# Patient Record
Sex: FEMALE | Race: WHITE | NOT HISPANIC OR LATINO
[De-identification: names, ages, dates, MRNs, and addresses within clinical notes are randomized per-mention and may not be internally consistent; named-entity substitution may affect disease eponyms.]

---

## 2020-08-24 ENCOUNTER — APPOINTMENT (OUTPATIENT)
Dept: ANTEPARTUM | Facility: CLINIC | Age: 35
End: 2020-08-24
Payer: COMMERCIAL

## 2020-08-24 PROCEDURE — 76801 OB US < 14 WKS SINGLE FETUS: CPT

## 2020-08-24 PROCEDURE — 76813 OB US NUCHAL MEAS 1 GEST: CPT

## 2020-08-24 PROCEDURE — 36415 COLL VENOUS BLD VENIPUNCTURE: CPT

## 2020-09-21 ENCOUNTER — APPOINTMENT (OUTPATIENT)
Dept: ANTEPARTUM | Facility: CLINIC | Age: 35
End: 2020-09-21
Payer: COMMERCIAL

## 2020-09-21 PROCEDURE — 76811 OB US DETAILED SNGL FETUS: CPT

## 2020-09-21 PROCEDURE — 76817 TRANSVAGINAL US OBSTETRIC: CPT | Mod: 59

## 2020-09-24 PROBLEM — Z00.00 ENCOUNTER FOR PREVENTIVE HEALTH EXAMINATION: Status: ACTIVE | Noted: 2020-09-24

## 2020-10-01 ENCOUNTER — APPOINTMENT (OUTPATIENT)
Dept: ANTEPARTUM | Facility: CLINIC | Age: 35
End: 2020-10-01
Payer: COMMERCIAL

## 2020-10-01 PROCEDURE — 76946 ECHO GUIDE FOR AMNIOCENTESIS: CPT

## 2020-10-01 PROCEDURE — 59000 AMNIOCENTESIS DIAGNOSTIC: CPT

## 2020-10-26 ENCOUNTER — APPOINTMENT (OUTPATIENT)
Dept: ANTEPARTUM | Facility: CLINIC | Age: 35
End: 2020-10-26
Payer: COMMERCIAL

## 2020-10-26 PROCEDURE — 76816 OB US FOLLOW-UP PER FETUS: CPT

## 2020-10-26 PROCEDURE — 76817 TRANSVAGINAL US OBSTETRIC: CPT | Mod: 59

## 2020-10-26 PROCEDURE — 99072 ADDL SUPL MATRL&STAF TM PHE: CPT

## 2020-12-21 ENCOUNTER — APPOINTMENT (OUTPATIENT)
Dept: ANTEPARTUM | Facility: CLINIC | Age: 35
End: 2020-12-21
Payer: COMMERCIAL

## 2020-12-21 PROCEDURE — 99072 ADDL SUPL MATRL&STAF TM PHE: CPT

## 2020-12-21 PROCEDURE — 76817 TRANSVAGINAL US OBSTETRIC: CPT | Mod: 59

## 2020-12-21 PROCEDURE — 76819 FETAL BIOPHYS PROFIL W/O NST: CPT

## 2020-12-21 PROCEDURE — 76816 OB US FOLLOW-UP PER FETUS: CPT

## 2021-01-19 ENCOUNTER — APPOINTMENT (OUTPATIENT)
Dept: ANTEPARTUM | Facility: CLINIC | Age: 36
End: 2021-01-19

## 2021-02-02 ENCOUNTER — APPOINTMENT (OUTPATIENT)
Dept: ANTEPARTUM | Facility: CLINIC | Age: 36
End: 2021-02-02
Payer: COMMERCIAL

## 2021-02-02 PROCEDURE — 76818 FETAL BIOPHYS PROFILE W/NST: CPT

## 2021-02-02 PROCEDURE — 99072 ADDL SUPL MATRL&STAF TM PHE: CPT

## 2021-02-02 PROCEDURE — 76816 OB US FOLLOW-UP PER FETUS: CPT

## 2021-02-08 ENCOUNTER — APPOINTMENT (OUTPATIENT)
Dept: ANTEPARTUM | Facility: CLINIC | Age: 36
End: 2021-02-08
Payer: COMMERCIAL

## 2021-02-08 PROCEDURE — 76816 OB US FOLLOW-UP PER FETUS: CPT

## 2021-02-08 PROCEDURE — 99072 ADDL SUPL MATRL&STAF TM PHE: CPT

## 2021-02-08 PROCEDURE — 76819 FETAL BIOPHYS PROFIL W/O NST: CPT

## 2021-02-15 ENCOUNTER — APPOINTMENT (OUTPATIENT)
Dept: ANTEPARTUM | Facility: CLINIC | Age: 36
End: 2021-02-15

## 2021-03-01 ENCOUNTER — TRANSCRIPTION ENCOUNTER (OUTPATIENT)
Age: 36
End: 2021-03-01

## 2021-03-02 ENCOUNTER — APPOINTMENT (OUTPATIENT)
Dept: ANTEPARTUM | Facility: CLINIC | Age: 36
End: 2021-03-02
Payer: COMMERCIAL

## 2021-03-02 ENCOUNTER — INPATIENT (INPATIENT)
Facility: HOSPITAL | Age: 36
LOS: 2 days | Discharge: HOME | End: 2021-03-05
Attending: OBSTETRICS & GYNECOLOGY | Admitting: OBSTETRICS & GYNECOLOGY
Payer: COMMERCIAL

## 2021-03-02 ENCOUNTER — OUTPATIENT (OUTPATIENT)
Dept: OUTPATIENT SERVICES | Facility: HOSPITAL | Age: 36
LOS: 1 days | End: 2021-03-02
Payer: COMMERCIAL

## 2021-03-02 ENCOUNTER — ASOB RESULT (OUTPATIENT)
Age: 36
End: 2021-03-02

## 2021-03-02 VITALS — HEIGHT: 65 IN | WEIGHT: 154.32 LBS

## 2021-03-02 DIAGNOSIS — Z01.818 ENCOUNTER FOR OTHER PREPROCEDURAL EXAMINATION: ICD-10-CM

## 2021-03-02 DIAGNOSIS — O26.899 OTHER SPECIFIED PREGNANCY RELATED CONDITIONS, UNSPECIFIED TRIMESTER: ICD-10-CM

## 2021-03-02 DIAGNOSIS — Z3A.00 WEEKS OF GESTATION OF PREGNANCY NOT SPECIFIED: ICD-10-CM

## 2021-03-02 LAB
BLD GP AB SCN SERPL QL: NEGATIVE — SIGNIFICANT CHANGE UP
BLD GP AB SCN SERPL QL: NEGATIVE — SIGNIFICANT CHANGE UP
HCT VFR BLD CALC: 37.8 % — SIGNIFICANT CHANGE UP (ref 34.5–45)
HGB BLD-MCNC: 12.5 G/DL — SIGNIFICANT CHANGE UP (ref 11.5–15.5)
MCHC RBC-ENTMCNC: 30.6 PG — SIGNIFICANT CHANGE UP (ref 27–34)
MCHC RBC-ENTMCNC: 33.1 GM/DL — SIGNIFICANT CHANGE UP (ref 32–36)
MCV RBC AUTO: 92.6 FL — SIGNIFICANT CHANGE UP (ref 80–100)
NRBC # BLD: 0 /100 WBCS — SIGNIFICANT CHANGE UP (ref 0–0)
PLATELET # BLD AUTO: 218 K/UL — SIGNIFICANT CHANGE UP (ref 150–400)
RBC # BLD: 4.08 M/UL — SIGNIFICANT CHANGE UP (ref 3.8–5.2)
RBC # FLD: 13.6 % — SIGNIFICANT CHANGE UP (ref 10.3–14.5)
RH IG SCN BLD-IMP: POSITIVE — SIGNIFICANT CHANGE UP
RH IG SCN BLD-IMP: POSITIVE — SIGNIFICANT CHANGE UP
WBC # BLD: 9.03 K/UL — SIGNIFICANT CHANGE UP (ref 3.8–10.5)
WBC # FLD AUTO: 9.03 K/UL — SIGNIFICANT CHANGE UP (ref 3.8–10.5)

## 2021-03-02 PROCEDURE — 85027 COMPLETE CBC AUTOMATED: CPT

## 2021-03-02 PROCEDURE — 99072 ADDL SUPL MATRL&STAF TM PHE: CPT

## 2021-03-02 PROCEDURE — 76816 OB US FOLLOW-UP PER FETUS: CPT

## 2021-03-02 PROCEDURE — 76819 FETAL BIOPHYS PROFIL W/O NST: CPT

## 2021-03-02 RX ORDER — BUTORPHANOL TARTRATE 2 MG/ML
0.25 INJECTION, SOLUTION INTRAMUSCULAR; INTRAVENOUS EVERY 6 HOURS
Refills: 0 | Status: DISCONTINUED | OUTPATIENT
Start: 2021-03-02 | End: 2021-03-02

## 2021-03-02 RX ORDER — ONDANSETRON 8 MG/1
4 TABLET, FILM COATED ORAL EVERY 6 HOURS
Refills: 0 | Status: DISCONTINUED | OUTPATIENT
Start: 2021-03-02 | End: 2021-03-05

## 2021-03-02 RX ORDER — LANOLIN
1 OINTMENT (GRAM) TOPICAL EVERY 6 HOURS
Refills: 0 | Status: DISCONTINUED | OUTPATIENT
Start: 2021-03-02 | End: 2021-03-05

## 2021-03-02 RX ORDER — OXYTOCIN 10 UNIT/ML
333.33 VIAL (ML) INJECTION
Qty: 20 | Refills: 0 | Status: DISCONTINUED | OUTPATIENT
Start: 2021-03-02 | End: 2021-03-02

## 2021-03-02 RX ORDER — SODIUM CHLORIDE 9 MG/ML
1000 INJECTION, SOLUTION INTRAVENOUS ONCE
Refills: 0 | Status: COMPLETED | OUTPATIENT
Start: 2021-03-02 | End: 2021-03-02

## 2021-03-02 RX ORDER — OXYTOCIN 10 UNIT/ML
333.33 VIAL (ML) INJECTION
Qty: 20 | Refills: 0 | Status: DISCONTINUED | OUTPATIENT
Start: 2021-03-02 | End: 2021-03-05

## 2021-03-02 RX ORDER — TETANUS TOXOID, REDUCED DIPHTHERIA TOXOID AND ACELLULAR PERTUSSIS VACCINE, ADSORBED 5; 2.5; 8; 8; 2.5 [IU]/.5ML; [IU]/.5ML; UG/.5ML; UG/.5ML; UG/.5ML
0.5 SUSPENSION INTRAMUSCULAR ONCE
Refills: 0 | Status: DISCONTINUED | OUTPATIENT
Start: 2021-03-02 | End: 2021-03-05

## 2021-03-02 RX ORDER — CITRIC ACID/SODIUM CITRATE 300-500 MG
30 SOLUTION, ORAL ORAL ONCE
Refills: 0 | Status: COMPLETED | OUTPATIENT
Start: 2021-03-02 | End: 2021-03-02

## 2021-03-02 RX ORDER — SODIUM CHLORIDE 9 MG/ML
1000 INJECTION, SOLUTION INTRAVENOUS
Refills: 0 | Status: DISCONTINUED | OUTPATIENT
Start: 2021-03-02 | End: 2021-03-02

## 2021-03-02 RX ORDER — SODIUM CHLORIDE 9 MG/ML
1000 INJECTION, SOLUTION INTRAVENOUS
Refills: 0 | Status: DISCONTINUED | OUTPATIENT
Start: 2021-03-02 | End: 2021-03-05

## 2021-03-02 RX ORDER — OXYCODONE HYDROCHLORIDE 5 MG/1
5 TABLET ORAL
Refills: 0 | Status: DISCONTINUED | OUTPATIENT
Start: 2021-03-02 | End: 2021-03-05

## 2021-03-02 RX ORDER — SIMETHICONE 80 MG/1
80 TABLET, CHEWABLE ORAL EVERY 4 HOURS
Refills: 0 | Status: DISCONTINUED | OUTPATIENT
Start: 2021-03-02 | End: 2021-03-05

## 2021-03-02 RX ORDER — NALOXONE HYDROCHLORIDE 4 MG/.1ML
0.1 SPRAY NASAL
Refills: 0 | Status: DISCONTINUED | OUTPATIENT
Start: 2021-03-02 | End: 2021-03-05

## 2021-03-02 RX ORDER — METOCLOPRAMIDE HCL 10 MG
10 TABLET ORAL ONCE
Refills: 0 | Status: DISCONTINUED | OUTPATIENT
Start: 2021-03-02 | End: 2021-03-02

## 2021-03-02 RX ORDER — IBUPROFEN 200 MG
600 TABLET ORAL EVERY 6 HOURS
Refills: 0 | Status: COMPLETED | OUTPATIENT
Start: 2021-03-02 | End: 2022-01-29

## 2021-03-02 RX ORDER — DIPHENHYDRAMINE HCL 50 MG
25 CAPSULE ORAL EVERY 6 HOURS
Refills: 0 | Status: DISCONTINUED | OUTPATIENT
Start: 2021-03-02 | End: 2021-03-05

## 2021-03-02 RX ORDER — FAMOTIDINE 10 MG/ML
20 INJECTION INTRAVENOUS ONCE
Refills: 0 | Status: COMPLETED | OUTPATIENT
Start: 2021-03-02 | End: 2021-03-02

## 2021-03-02 RX ORDER — KETOROLAC TROMETHAMINE 30 MG/ML
30 SYRINGE (ML) INJECTION EVERY 6 HOURS
Refills: 0 | Status: DISCONTINUED | OUTPATIENT
Start: 2021-03-02 | End: 2021-03-03

## 2021-03-02 RX ORDER — MORPHINE SULFATE 50 MG/1
0.2 CAPSULE, EXTENDED RELEASE ORAL ONCE
Refills: 0 | Status: DISCONTINUED | OUTPATIENT
Start: 2021-03-02 | End: 2021-03-05

## 2021-03-02 RX ORDER — DIPHENHYDRAMINE HCL 50 MG
25 CAPSULE ORAL EVERY 4 HOURS
Refills: 0 | Status: DISCONTINUED | OUTPATIENT
Start: 2021-03-02 | End: 2021-03-05

## 2021-03-02 RX ORDER — DEXAMETHASONE 0.5 MG/5ML
4 ELIXIR ORAL EVERY 6 HOURS
Refills: 0 | Status: DISCONTINUED | OUTPATIENT
Start: 2021-03-02 | End: 2021-03-05

## 2021-03-02 RX ORDER — ACETAMINOPHEN 500 MG
975 TABLET ORAL
Refills: 0 | Status: DISCONTINUED | OUTPATIENT
Start: 2021-03-02 | End: 2021-03-05

## 2021-03-02 RX ORDER — ENOXAPARIN SODIUM 100 MG/ML
40 INJECTION SUBCUTANEOUS EVERY 24 HOURS
Refills: 0 | Status: DISCONTINUED | OUTPATIENT
Start: 2021-03-03 | End: 2021-03-05

## 2021-03-02 RX ORDER — OXYCODONE HYDROCHLORIDE 5 MG/1
5 TABLET ORAL ONCE
Refills: 0 | Status: DISCONTINUED | OUTPATIENT
Start: 2021-03-02 | End: 2021-03-05

## 2021-03-02 RX ORDER — CEFAZOLIN SODIUM 1 G
2000 VIAL (EA) INJECTION ONCE
Refills: 0 | Status: COMPLETED | OUTPATIENT
Start: 2021-03-02 | End: 2021-03-02

## 2021-03-02 RX ORDER — MAGNESIUM HYDROXIDE 400 MG/1
30 TABLET, CHEWABLE ORAL
Refills: 0 | Status: DISCONTINUED | OUTPATIENT
Start: 2021-03-02 | End: 2021-03-05

## 2021-03-02 RX ADMIN — Medication 975 MILLIGRAM(S): at 22:15

## 2021-03-02 RX ADMIN — SODIUM CHLORIDE 2000 MILLILITER(S): 9 INJECTION, SOLUTION INTRAVENOUS at 15:50

## 2021-03-02 RX ADMIN — FAMOTIDINE 20 MILLIGRAM(S): 10 INJECTION INTRAVENOUS at 17:12

## 2021-03-02 RX ADMIN — Medication 1000 MILLIUNIT(S)/MIN: at 18:27

## 2021-03-02 RX ADMIN — Medication 100 MILLIGRAM(S): at 17:44

## 2021-03-02 RX ADMIN — Medication 30 MILLILITER(S): at 17:44

## 2021-03-02 RX ADMIN — SODIUM CHLORIDE 125 MILLILITER(S): 9 INJECTION, SOLUTION INTRAVENOUS at 17:25

## 2021-03-02 RX ADMIN — Medication 30 MILLIGRAM(S): at 23:53

## 2021-03-02 NOTE — PROVIDER CONTACT NOTE (OTHER) - ASSESSMENT
Fundus firm at umbilicus, medium sized clot present on pad with slow but active trickling. Patient denies dizziness, lightheadedness. VSS,  this past hour as per L&D RN.

## 2021-03-03 LAB
BASOPHILS # BLD AUTO: 0.04 K/UL — SIGNIFICANT CHANGE UP (ref 0–0.2)
BASOPHILS NFR BLD AUTO: 0.3 % — SIGNIFICANT CHANGE UP (ref 0–2)
EOSINOPHIL # BLD AUTO: 0.01 K/UL — SIGNIFICANT CHANGE UP (ref 0–0.5)
EOSINOPHIL NFR BLD AUTO: 0.1 % — SIGNIFICANT CHANGE UP (ref 0–6)
HCT VFR BLD CALC: 27.2 % — LOW (ref 34.5–45)
HGB BLD-MCNC: 9.1 G/DL — LOW (ref 11.5–15.5)
IMM GRANULOCYTES NFR BLD AUTO: 1 % — SIGNIFICANT CHANGE UP (ref 0–1.5)
LYMPHOCYTES # BLD AUTO: 15.2 % — SIGNIFICANT CHANGE UP (ref 13–44)
LYMPHOCYTES # BLD AUTO: 2.16 K/UL — SIGNIFICANT CHANGE UP (ref 1–3.3)
MCHC RBC-ENTMCNC: 30.6 PG — SIGNIFICANT CHANGE UP (ref 27–34)
MCHC RBC-ENTMCNC: 33.5 GM/DL — SIGNIFICANT CHANGE UP (ref 32–36)
MCV RBC AUTO: 91.6 FL — SIGNIFICANT CHANGE UP (ref 80–100)
MONOCYTES # BLD AUTO: 1.25 K/UL — HIGH (ref 0–0.9)
MONOCYTES NFR BLD AUTO: 8.8 % — SIGNIFICANT CHANGE UP (ref 2–14)
NEUTROPHILS # BLD AUTO: 10.59 K/UL — HIGH (ref 1.8–7.4)
NEUTROPHILS NFR BLD AUTO: 74.6 % — SIGNIFICANT CHANGE UP (ref 43–77)
NRBC # BLD: 0 /100 WBCS — SIGNIFICANT CHANGE UP (ref 0–0)
PLATELET # BLD AUTO: 161 K/UL — SIGNIFICANT CHANGE UP (ref 150–400)
RBC # BLD: 2.97 M/UL — LOW (ref 3.8–5.2)
RBC # FLD: 13.5 % — SIGNIFICANT CHANGE UP (ref 10.3–14.5)
SARS-COV-2 IGG SERPL QL IA: NEGATIVE — SIGNIFICANT CHANGE UP
SARS-COV-2 IGM SERPL IA-ACNC: 0.07 INDEX — SIGNIFICANT CHANGE UP
T PALLIDUM AB TITR SER: NEGATIVE — SIGNIFICANT CHANGE UP
WBC # BLD: 14.19 K/UL — HIGH (ref 3.8–10.5)
WBC # FLD AUTO: 14.19 K/UL — HIGH (ref 3.8–10.5)

## 2021-03-03 RX ORDER — IBUPROFEN 200 MG
600 TABLET ORAL EVERY 6 HOURS
Refills: 0 | Status: DISCONTINUED | OUTPATIENT
Start: 2021-03-03 | End: 2021-03-05

## 2021-03-03 RX ADMIN — Medication 975 MILLIGRAM(S): at 08:47

## 2021-03-03 RX ADMIN — Medication 975 MILLIGRAM(S): at 16:03

## 2021-03-03 RX ADMIN — Medication 600 MILLIGRAM(S): at 23:55

## 2021-03-03 RX ADMIN — ENOXAPARIN SODIUM 40 MILLIGRAM(S): 100 INJECTION SUBCUTANEOUS at 06:10

## 2021-03-03 RX ADMIN — Medication 30 MILLIGRAM(S): at 12:07

## 2021-03-03 RX ADMIN — Medication 975 MILLIGRAM(S): at 03:10

## 2021-03-03 RX ADMIN — SIMETHICONE 80 MILLIGRAM(S): 80 TABLET, CHEWABLE ORAL at 21:06

## 2021-03-03 RX ADMIN — SIMETHICONE 80 MILLIGRAM(S): 80 TABLET, CHEWABLE ORAL at 12:07

## 2021-03-03 RX ADMIN — Medication 975 MILLIGRAM(S): at 21:36

## 2021-03-03 RX ADMIN — Medication 600 MILLIGRAM(S): at 18:25

## 2021-03-03 RX ADMIN — Medication 30 MILLIGRAM(S): at 06:10

## 2021-03-03 RX ADMIN — Medication 975 MILLIGRAM(S): at 21:06

## 2021-03-03 NOTE — LACTATION INITIAL EVALUATION - NS LACT CON REASON FOR REQ
FT baby boy 39+4 s/p elective primary  seen at 22 hours of life. baby + latch in L+D, mom reports no pain or discomfort in subsequent latches in PP. v/m and wt loss WNL. + colostrum bilaterally, + latch during consult on both sides. infant feeding plan -- ad arabella feeds with no more than 3 hours to elapse between the start of the feeds. reassurance provided all questions answered primary RN made aware./primaparous mom

## 2021-03-03 NOTE — LACTATION INITIAL EVALUATION - LACTATION INTERVENTIONS
initiate skin to skin/initiate hand expression routine/initiate/review techniques for position and latch/initiate/review breast massage/compression

## 2021-03-04 ENCOUNTER — TRANSCRIPTION ENCOUNTER (OUTPATIENT)
Age: 36
End: 2021-03-04

## 2021-03-04 RX ORDER — ACETAMINOPHEN 500 MG
3 TABLET ORAL
Qty: 0 | Refills: 0 | DISCHARGE
Start: 2021-03-04

## 2021-03-04 RX ORDER — IBUPROFEN 200 MG
1 TABLET ORAL
Qty: 0 | Refills: 0 | DISCHARGE
Start: 2021-03-04

## 2021-03-04 RX ADMIN — SIMETHICONE 80 MILLIGRAM(S): 80 TABLET, CHEWABLE ORAL at 15:00

## 2021-03-04 RX ADMIN — Medication 975 MILLIGRAM(S): at 21:50

## 2021-03-04 RX ADMIN — Medication 600 MILLIGRAM(S): at 06:10

## 2021-03-04 RX ADMIN — Medication 975 MILLIGRAM(S): at 15:30

## 2021-03-04 RX ADMIN — SIMETHICONE 80 MILLIGRAM(S): 80 TABLET, CHEWABLE ORAL at 08:41

## 2021-03-04 RX ADMIN — SIMETHICONE 80 MILLIGRAM(S): 80 TABLET, CHEWABLE ORAL at 21:20

## 2021-03-04 RX ADMIN — Medication 975 MILLIGRAM(S): at 09:00

## 2021-03-04 RX ADMIN — SIMETHICONE 80 MILLIGRAM(S): 80 TABLET, CHEWABLE ORAL at 03:19

## 2021-03-04 RX ADMIN — Medication 600 MILLIGRAM(S): at 23:56

## 2021-03-04 RX ADMIN — Medication 600 MILLIGRAM(S): at 12:00

## 2021-03-04 RX ADMIN — ENOXAPARIN SODIUM 40 MILLIGRAM(S): 100 INJECTION SUBCUTANEOUS at 06:10

## 2021-03-04 RX ADMIN — Medication 600 MILLIGRAM(S): at 12:30

## 2021-03-04 RX ADMIN — Medication 975 MILLIGRAM(S): at 15:00

## 2021-03-04 RX ADMIN — Medication 975 MILLIGRAM(S): at 03:19

## 2021-03-04 RX ADMIN — Medication 600 MILLIGRAM(S): at 18:00

## 2021-03-04 RX ADMIN — Medication 600 MILLIGRAM(S): at 00:25

## 2021-03-04 RX ADMIN — Medication 975 MILLIGRAM(S): at 09:30

## 2021-03-04 RX ADMIN — Medication 975 MILLIGRAM(S): at 21:20

## 2021-03-04 RX ADMIN — Medication 975 MILLIGRAM(S): at 03:49

## 2021-03-04 RX ADMIN — Medication 600 MILLIGRAM(S): at 06:40

## 2021-03-04 RX ADMIN — Medication 600 MILLIGRAM(S): at 18:30

## 2021-03-04 NOTE — DISCHARGE NOTE OB - PATIENT PORTAL LINK FT
You can access the FollowMyHealth Patient Portal offered by [facility] by registering at the following website: [email or web address]. By joining [facility] FollowMyHealth portal, you will also be able to view your health information using other applications (apps) compatible with our system.

## 2021-03-05 VITALS
RESPIRATION RATE: 18 BRPM | OXYGEN SATURATION: 99 % | DIASTOLIC BLOOD PRESSURE: 74 MMHG | SYSTOLIC BLOOD PRESSURE: 133 MMHG | TEMPERATURE: 98 F | HEART RATE: 74 BPM

## 2021-03-05 PROCEDURE — 99214 OFFICE O/P EST MOD 30 MIN: CPT

## 2021-03-05 PROCEDURE — 85025 COMPLETE CBC W/AUTO DIFF WBC: CPT

## 2021-03-05 PROCEDURE — 86780 TREPONEMA PALLIDUM: CPT

## 2021-03-05 PROCEDURE — 86900 BLOOD TYPING SEROLOGIC ABO: CPT

## 2021-03-05 PROCEDURE — 59050 FETAL MONITOR W/REPORT: CPT

## 2021-03-05 PROCEDURE — 86850 RBC ANTIBODY SCREEN: CPT

## 2021-03-05 PROCEDURE — 86769 SARS-COV-2 COVID-19 ANTIBODY: CPT

## 2021-03-05 PROCEDURE — 36415 COLL VENOUS BLD VENIPUNCTURE: CPT

## 2021-03-05 PROCEDURE — 86901 BLOOD TYPING SEROLOGIC RH(D): CPT

## 2021-03-05 RX ADMIN — SIMETHICONE 80 MILLIGRAM(S): 80 TABLET, CHEWABLE ORAL at 06:35

## 2021-03-05 RX ADMIN — Medication 600 MILLIGRAM(S): at 00:28

## 2021-03-05 RX ADMIN — ENOXAPARIN SODIUM 40 MILLIGRAM(S): 100 INJECTION SUBCUTANEOUS at 06:35

## 2021-03-05 RX ADMIN — Medication 600 MILLIGRAM(S): at 07:05

## 2021-03-05 RX ADMIN — Medication 600 MILLIGRAM(S): at 06:35

## 2021-03-05 RX ADMIN — Medication 975 MILLIGRAM(S): at 09:40

## 2021-03-05 NOTE — PROGRESS NOTE ADULT - ASSESSMENT
A/P: 36y s/p  section, POD #1, stable  -  Pain: motrin/acetaminophen, oxycodone for severe pain PRN  -  Post-operatively labs: post-op Hgb pending, no s/sx of anemia  -  GI: tolerating clears, passing gas, ADAT  -  : eli in situ; DC this AM, f/u TOV  -  DVT prophylaxis: ambulation, SCDs, SQH  -  Dispo: POD 3 or 4
A/P: 36y POD2 s/p  section. Pt is hemodynamically stable.   -  Neuro-Pain control with motrin/acetaminophen, oxycodone for severe pain PRN  -  GI: Tolerating regular diet, passing gas.   -  : s/p eli. Voiding spontaneously.   -  DVT prophylaxis: Lovenox 40mg qd, SCDs  -  Activity- ambulated as tolerated   -  Heme: post-op hemoglobin stable   -  Patient counseled on return precautions (fever, chills, increased pain/heavy bleeding)  -  Dispo: POD3
A/P: 36y POD2 s/p  section. Pt is hemodynamically stable.   -  Neuro-Pain control with motrin/acetaminophen, oxycodone for severe pain PRN  -  GI: Tolerating regular diet, passing gas.   -  : s/p eli. Voiding spontaneously.   -  DVT prophylaxis: Lovenox 40mg qd, SCDs  -  Activity- ambulated as tolerated   -  Heme: post-op hemoglobin stable   - Dispo: POD3

## 2021-03-05 NOTE — PROGRESS NOTE ADULT - SUBJECTIVE AND OBJECTIVE BOX
Patient seen and evaluated at bedside and endorses tenderness at the incision. Pt states the pain improves with oral medication. She is tolerating clear/reg diet, passing flatus, and voiding. Pt is breastfeeding.  She denies headache, dizziness, chest pain, palpitations, shortness of breath, nausea, vomiting, or heavy vaginal bleeding.    Physical Exam:  Vital Signs Last 24 Hrs  T(C): 36.9 (04 Mar 2021 06:00), Max: 37.2 (03 Mar 2021 18:00)  T(F): 98.4 (04 Mar 2021 06:00), Max: 98.9 (03 Mar 2021 18:00)  HR: 86 (04 Mar 2021 06:00) (64 - 88)  BP: 114/71 (04 Mar 2021 06:00) (107/67 - 114/71)  BP(mean): 85 (04 Mar 2021 06:00) (85 - 85)  RR: 18 (04 Mar 2021 06:00) (18 - 18)  SpO2: 97% (04 Mar 2021 06:00) (97% - 97%)    GA: comfortable in NAD  Abd: soft, nontender, nondistended, no rebound or guarding, incision clean, dry and intact, uterus firm at midline, 2 fb below umbilicus  : Lochia WNL  Extremities: no swelling or calf tenderness                            9.1    14.19 )-----------( 161      ( 03 Mar 2021 08:20 )             27.2               acetaminophen   Tablet .. 975 milliGRAM(s) Oral <User Schedule>  dexAMETHasone  Injectable 4 milliGRAM(s) IV Push every 6 hours PRN  diphenhydrAMINE 25 milliGRAM(s) Oral every 4 hours PRN  diphenhydrAMINE 25 milliGRAM(s) Oral every 6 hours PRN  diphtheria/tetanus/pertussis (acellular) Vaccine (ADAcel) 0.5 milliLiter(s) IntraMuscular once  enoxaparin Injectable 40 milliGRAM(s) SubCutaneous every 24 hours  ibuprofen  Tablet. 600 milliGRAM(s) Oral every 6 hours  lactated ringers. 1000 milliLiter(s) IV Continuous <Continuous>  lanolin Ointment 1 Application(s) Topical every 6 hours PRN  magnesium hydroxide Suspension 30 milliLiter(s) Oral two times a day PRN  morphine PF Spinal 0.2 milliGRAM(s) IntraThecal. once  naloxone Injectable 0.1 milliGRAM(s) IV Push every 3 minutes PRN  ondansetron Injectable 4 milliGRAM(s) IV Push every 6 hours PRN  oxyCODONE    IR 5 milliGRAM(s) Oral every 3 hours PRN  oxyCODONE    IR 5 milliGRAM(s) Oral once PRN  oxytocin Infusion 333.333 milliUNIT(s)/Min IV Continuous <Continuous>  simethicone 80 milliGRAM(s) Chew every 4 hours PRN  
Patient is doing very well. Pain is controlled. Ambulating, tolerating food, voiding. Lactation is going well. 
Patient evaluated at bedside this morning on POD#1, resting comfortable in bed with no acute events overnight.  She reports pain is well controlled with PO pain meds   She denies headache, dizziness, chest pain, palpitations, shortness of breath, nausea, vomiting, or heavy vaginal bleeding.  She has not tried ambulating since procedure, eli remains in place at this time.  Tolerating clear liquids.     Physical Exam:  Vital Signs Last 24 Hrs  T(C): 36.6 (03 Mar 2021 06:00), Max: 36.8 (02 Mar 2021 22:35)  T(F): 97.9 (03 Mar 2021 06:00), Max: 98.2 (02 Mar 2021 22:35)  HR: 73 (03 Mar 2021 06:00) (68 - 80)  BP: 111/67 (03 Mar 2021 06:00) (92/55 - 132/58)  BP(mean): --  RR: 19 (03 Mar 2021 06:00) (15 - 19)  SpO2: 98% (03 Mar 2021 06:00) (96% - 98%)    GA: comfortable in NAD  Abd: soft, nontender, nondistended, no rebound or guarding, incision clean, dry and intact, uterus firm at midline, 2 fb below umbilicus  : eli in situ, lochia WNL  Extremities: no swelling or calf tenderness, SCDs in place                            12.5   9.03  )-----------( 218      ( 02 Mar 2021 15:13 )             37.8               acetaminophen   Tablet .. 975 milliGRAM(s) Oral <User Schedule>  butorphanol Injectable 0.25 milliGRAM(s) IV Push every 6 hours PRN  dexAMETHasone  Injectable 4 milliGRAM(s) IV Push every 6 hours PRN  diphenhydrAMINE 25 milliGRAM(s) Oral every 4 hours PRN  diphenhydrAMINE 25 milliGRAM(s) Oral every 6 hours PRN  diphtheria/tetanus/pertussis (acellular) Vaccine (ADAcel) 0.5 milliLiter(s) IntraMuscular once  enoxaparin Injectable 40 milliGRAM(s) SubCutaneous every 24 hours  ibuprofen  Tablet. 600 milliGRAM(s) Oral every 6 hours  ketorolac   Injectable 30 milliGRAM(s) IV Push every 6 hours  lactated ringers. 1000 milliLiter(s) IV Continuous <Continuous>  lanolin Ointment 1 Application(s) Topical every 6 hours PRN  magnesium hydroxide Suspension 30 milliLiter(s) Oral two times a day PRN  morphine PF Spinal 0.2 milliGRAM(s) IntraThecal. once  naloxone Injectable 0.1 milliGRAM(s) IV Push every 3 minutes PRN  ondansetron Injectable 4 milliGRAM(s) IV Push every 6 hours PRN  oxyCODONE    IR 5 milliGRAM(s) Oral every 3 hours PRN  oxyCODONE    IR 5 milliGRAM(s) Oral once PRN  oxytocin Infusion 333.333 milliUNIT(s)/Min IV Continuous <Continuous>  simethicone 80 milliGRAM(s) Chew every 4 hours PRN  
Patient seen and evaluated at bedside. Pt is POD#3 from a  and endorses tenderness pressure on the left side of the incision. Pt states the pain improves with oral medication. She is tolerating reg diet, passing flatus, and voiding. Pt is breastfeeding.  She denies headache, dizziness, chest pain, palpitations, shortness of breath, nausea, vomiting, or heavy vaginal bleeding.    Physical Exam:  Vital Signs Last 24 Hrs  T(C): 36.4 (05 Mar 2021 05:43), Max: 37.4 (04 Mar 2021 13:59)  T(F): 97.5 (05 Mar 2021 05:43), Max: 99.3 (04 Mar 2021 13:59)  HR: 69 (05 Mar 2021 05:43) (69 - 87)  BP: 113/75 (05 Mar 2021 05:43) (113/70 - 124/79)  BP(mean): --  RR: 18 (05 Mar 2021 05:43) (17 - 18)  SpO2: 97% (05 Mar 2021 05:43) (97% - 99%)    GA: comfortable in NAD  Abd: soft, nontender, nondistended, no rebound or guarding, incision clean, dry and intact, uterus firm at midline, 2 fb below umbilicus  : Lochia WNL  Extremities: no swelling or calf tenderness                            9.1    14.19 )-----------( 161      ( 03 Mar 2021 08:20 )             27.2               acetaminophen   Tablet .. 975 milliGRAM(s) Oral <User Schedule>  dexAMETHasone  Injectable 4 milliGRAM(s) IV Push every 6 hours PRN  diphenhydrAMINE 25 milliGRAM(s) Oral every 4 hours PRN  diphenhydrAMINE 25 milliGRAM(s) Oral every 6 hours PRN  diphtheria/tetanus/pertussis (acellular) Vaccine (ADAcel) 0.5 milliLiter(s) IntraMuscular once  enoxaparin Injectable 40 milliGRAM(s) SubCutaneous every 24 hours  ibuprofen  Tablet. 600 milliGRAM(s) Oral every 6 hours  lactated ringers. 1000 milliLiter(s) IV Continuous <Continuous>  lanolin Ointment 1 Application(s) Topical every 6 hours PRN  magnesium hydroxide Suspension 30 milliLiter(s) Oral two times a day PRN  morphine PF Spinal 0.2 milliGRAM(s) IntraThecal. once  naloxone Injectable 0.1 milliGRAM(s) IV Push every 3 minutes PRN  ondansetron Injectable 4 milliGRAM(s) IV Push every 6 hours PRN  oxyCODONE    IR 5 milliGRAM(s) Oral every 3 hours PRN  oxyCODONE    IR 5 milliGRAM(s) Oral once PRN  oxytocin Infusion 333.333 milliUNIT(s)/Min IV Continuous <Continuous>  simethicone 80 milliGRAM(s) Chew every 4 hours PRN

## 2021-03-08 DIAGNOSIS — Z3A.39 39 WEEKS GESTATION OF PREGNANCY: ICD-10-CM

## 2025-02-28 ENCOUNTER — TRANSCRIPTION ENCOUNTER (OUTPATIENT)
Age: 40
End: 2025-02-28

## 2025-02-28 ENCOUNTER — ASOB RESULT (OUTPATIENT)
Age: 40
End: 2025-02-28

## 2025-02-28 ENCOUNTER — APPOINTMENT (OUTPATIENT)
Dept: ANTEPARTUM | Facility: CLINIC | Age: 40
End: 2025-02-28
Payer: COMMERCIAL

## 2025-02-28 PROCEDURE — 76801 OB US < 14 WKS SINGLE FETUS: CPT

## 2025-02-28 PROCEDURE — 76817 TRANSVAGINAL US OBSTETRIC: CPT

## 2025-03-18 ENCOUNTER — ASOB RESULT (OUTPATIENT)
Age: 40
End: 2025-03-18

## 2025-03-18 ENCOUNTER — APPOINTMENT (OUTPATIENT)
Dept: ANTEPARTUM | Facility: CLINIC | Age: 40
End: 2025-03-18
Payer: COMMERCIAL

## 2025-03-18 PROCEDURE — 76801 OB US < 14 WKS SINGLE FETUS: CPT

## 2025-03-18 PROCEDURE — 93976 VASCULAR STUDY: CPT

## 2025-03-18 PROCEDURE — 76813 OB US NUCHAL MEAS 1 GEST: CPT

## 2025-03-25 ENCOUNTER — ASOB RESULT (OUTPATIENT)
Age: 40
End: 2025-03-25

## 2025-03-25 ENCOUNTER — APPOINTMENT (OUTPATIENT)
Dept: ANTEPARTUM | Facility: CLINIC | Age: 40
End: 2025-03-25
Payer: COMMERCIAL

## 2025-03-25 PROCEDURE — 76945 ECHO GUIDE VILLUS SAMPLING: CPT

## 2025-03-25 PROCEDURE — 59015 CHORION BIOPSY: CPT

## 2025-04-24 ENCOUNTER — ASOB RESULT (OUTPATIENT)
Age: 40
End: 2025-04-24

## 2025-04-24 ENCOUNTER — APPOINTMENT (OUTPATIENT)
Dept: ANTEPARTUM | Facility: CLINIC | Age: 40
End: 2025-04-24
Payer: COMMERCIAL

## 2025-04-24 PROCEDURE — 76817 TRANSVAGINAL US OBSTETRIC: CPT

## 2025-04-24 PROCEDURE — 76805 OB US >/= 14 WKS SNGL FETUS: CPT

## 2025-05-19 ENCOUNTER — APPOINTMENT (OUTPATIENT)
Dept: ANTEPARTUM | Facility: CLINIC | Age: 40
End: 2025-05-19
Payer: COMMERCIAL

## 2025-05-19 ENCOUNTER — ASOB RESULT (OUTPATIENT)
Age: 40
End: 2025-05-19

## 2025-05-19 PROCEDURE — 76811 OB US DETAILED SNGL FETUS: CPT

## 2025-05-19 PROCEDURE — 76817 TRANSVAGINAL US OBSTETRIC: CPT

## 2025-05-30 ENCOUNTER — APPOINTMENT (OUTPATIENT)
Dept: ANTEPARTUM | Facility: CLINIC | Age: 40
End: 2025-05-30
Payer: COMMERCIAL

## 2025-05-30 ENCOUNTER — ASOB RESULT (OUTPATIENT)
Age: 40
End: 2025-05-30

## 2025-05-30 PROCEDURE — 76815 OB US LIMITED FETUS(S): CPT

## 2025-06-20 ENCOUNTER — NON-APPOINTMENT (OUTPATIENT)
Age: 40
End: 2025-06-20

## 2025-06-20 ENCOUNTER — APPOINTMENT (OUTPATIENT)
Dept: PEDIATRIC CARDIOLOGY | Facility: CLINIC | Age: 40
End: 2025-06-20
Payer: COMMERCIAL

## 2025-06-20 PROCEDURE — 76825 ECHO EXAM OF FETAL HEART: CPT

## 2025-06-20 PROCEDURE — 76820 UMBILICAL ARTERY ECHO: CPT

## 2025-06-20 PROCEDURE — 76827 ECHO EXAM OF FETAL HEART: CPT

## 2025-06-20 PROCEDURE — 76821 MIDDLE CEREBRAL ARTERY ECHO: CPT

## 2025-06-20 PROCEDURE — 93325 DOPPLER ECHO COLOR FLOW MAPG: CPT | Mod: 59

## 2025-06-20 PROCEDURE — 99203 OFFICE O/P NEW LOW 30 MIN: CPT | Mod: 25

## 2025-07-07 ENCOUNTER — ASOB RESULT (OUTPATIENT)
Age: 40
End: 2025-07-07

## 2025-07-07 ENCOUNTER — APPOINTMENT (OUTPATIENT)
Dept: ANTEPARTUM | Facility: CLINIC | Age: 40
End: 2025-07-07
Payer: COMMERCIAL

## 2025-07-07 PROCEDURE — 76820 UMBILICAL ARTERY ECHO: CPT | Mod: 59

## 2025-07-07 PROCEDURE — 76816 OB US FOLLOW-UP PER FETUS: CPT

## 2025-07-07 PROCEDURE — 76819 FETAL BIOPHYS PROFIL W/O NST: CPT | Mod: 59

## 2025-07-07 PROCEDURE — 76817 TRANSVAGINAL US OBSTETRIC: CPT

## 2025-07-28 ENCOUNTER — APPOINTMENT (OUTPATIENT)
Dept: ANTEPARTUM | Facility: CLINIC | Age: 40
End: 2025-07-28
Payer: COMMERCIAL

## 2025-07-28 ENCOUNTER — ASOB RESULT (OUTPATIENT)
Age: 40
End: 2025-07-28

## 2025-07-28 PROCEDURE — 76816 OB US FOLLOW-UP PER FETUS: CPT

## 2025-07-28 PROCEDURE — 76817 TRANSVAGINAL US OBSTETRIC: CPT

## 2025-07-28 PROCEDURE — 76819 FETAL BIOPHYS PROFIL W/O NST: CPT

## 2025-07-28 PROCEDURE — 76820 UMBILICAL ARTERY ECHO: CPT | Mod: 59

## 2025-08-12 ENCOUNTER — APPOINTMENT (OUTPATIENT)
Dept: ANTEPARTUM | Facility: CLINIC | Age: 40
End: 2025-08-12
Payer: COMMERCIAL

## 2025-08-12 ENCOUNTER — ASOB RESULT (OUTPATIENT)
Age: 40
End: 2025-08-12

## 2025-08-12 PROCEDURE — 76820 UMBILICAL ARTERY ECHO: CPT | Mod: 59

## 2025-08-12 PROCEDURE — 76819 FETAL BIOPHYS PROFIL W/O NST: CPT

## 2025-08-12 PROCEDURE — 76817 TRANSVAGINAL US OBSTETRIC: CPT

## 2025-08-12 PROCEDURE — 76816 OB US FOLLOW-UP PER FETUS: CPT

## 2025-08-14 ENCOUNTER — APPOINTMENT (OUTPATIENT)
Dept: ANTEPARTUM | Facility: CLINIC | Age: 40
End: 2025-08-14

## 2025-08-14 ENCOUNTER — ASOB RESULT (OUTPATIENT)
Age: 40
End: 2025-08-14

## 2025-08-14 PROCEDURE — 76819 FETAL BIOPHYS PROFIL W/O NST: CPT

## 2025-08-14 PROCEDURE — 76815 OB US LIMITED FETUS(S): CPT

## 2025-08-14 PROCEDURE — 76820 UMBILICAL ARTERY ECHO: CPT

## 2025-08-19 ENCOUNTER — TRANSCRIPTION ENCOUNTER (OUTPATIENT)
Age: 40
End: 2025-08-19